# Patient Record
Sex: MALE | Race: BLACK OR AFRICAN AMERICAN | Employment: UNEMPLOYED | URBAN - METROPOLITAN AREA
[De-identification: names, ages, dates, MRNs, and addresses within clinical notes are randomized per-mention and may not be internally consistent; named-entity substitution may affect disease eponyms.]

---

## 2018-10-24 ENCOUNTER — HOSPITAL ENCOUNTER (EMERGENCY)
Age: 23
Discharge: HOME | End: 2018-10-24
Attending: EMERGENCY MEDICINE
Payer: SELF-PAY

## 2018-10-24 VITALS
BODY MASS INDEX: 25.65 KG/M2 | OXYGEN SATURATION: 98 % | HEIGHT: 66 IN | DIASTOLIC BLOOD PRESSURE: 77 MMHG | SYSTOLIC BLOOD PRESSURE: 153 MMHG | WEIGHT: 159.61 LBS | RESPIRATION RATE: 16 BRPM | TEMPERATURE: 99.2 F | HEART RATE: 67 BPM

## 2018-10-24 DIAGNOSIS — K13.0 DRY LIPS: Primary | ICD-10-CM

## 2018-10-24 PROCEDURE — 99282 EMERGENCY DEPT VISIT SF MDM: CPT

## 2018-10-25 NOTE — ED PROVIDER NOTES
24 y.o. male with no significant past medical history presents with complaints of black spot on upper lip x 4 days. The pt denies any pain or tingling at the site. Denies any trouble swallowing. Denies fever, chills, nausea, vomiting. The pt states that he has male sexual partners. He reports negative HIV testing 2 years ago. He explained \"I got tested yesterday and I am waiting on the results. \"  There are no other acute medical complaints at this time. PCP: MD Dianne Burns PA-C Past Medical History:  
Diagnosis Date  Asthma Past Surgical History:  
Procedure Laterality Date  HX TONSIL AND ADENOIDECTOMY History reviewed. No pertinent family history. Social History Socioeconomic History  Marital status: SINGLE Spouse name: Not on file  Number of children: Not on file  Years of education: Not on file  Highest education level: Not on file Social Needs  Financial resource strain: Not on file  Food insecurity - worry: Not on file  Food insecurity - inability: Not on file  Transportation needs - medical: Not on file  Transportation needs - non-medical: Not on file Occupational History  Not on file Tobacco Use  Smoking status: Current Some Day Smoker Substance and Sexual Activity  Alcohol use: Yes  Drug use: No  
 Sexual activity: Yes  
  Partners: Male Birth control/protection: Condom Other Topics Concern  Not on file Social History Narrative  Not on file ALLERGIES: Patient has no known allergies. Review of Systems Constitutional: Negative for activity change, chills, diaphoresis, fever and unexpected weight change. HENT: Negative for congestion, ear pain, nosebleeds, rhinorrhea, sinus pressure, sore throat, trouble swallowing and voice change. Eyes: Negative for pain, discharge and itching.   
Respiratory: Negative for cough, chest tightness, shortness of breath and wheezing. Cardiovascular: Negative for chest pain, palpitations and leg swelling. Gastrointestinal: Negative for abdominal distention, abdominal pain, blood in stool, constipation, diarrhea, nausea and vomiting. Endocrine: Negative for cold intolerance, heat intolerance and polyuria. Genitourinary: Negative for decreased urine volume, discharge, dysuria, flank pain, hematuria and testicular pain. Musculoskeletal: Negative for arthralgias, gait problem, myalgias and neck pain. Skin: Negative for color change and pallor. Neurological: Negative for dizziness, syncope, speech difficulty, weakness, light-headedness, numbness and headaches. Psychiatric/Behavioral: Negative for behavioral problems, confusion, hallucinations and suicidal ideas. Vitals:  
 10/24/18 2114 BP: 153/77 Pulse: 67 Resp: 16 Temp: 99.2 °F (37.3 °C) SpO2: 98% Weight: 72.4 kg (159 lb 9.8 oz) Height: 5' 6\" (1.676 m) Physical Exam  
Constitutional: He is oriented to person, place, and time. He appears well-developed and well-nourished. No distress. HENT:  
Head: Normocephalic and atraumatic. Head is without raccoon's eyes, without Robertson's sign and without laceration. Right Ear: Hearing, tympanic membrane, external ear and ear canal normal. No foreign bodies. Tympanic membrane is not bulging. No hemotympanum. Left Ear: Hearing, tympanic membrane, external ear and ear canal normal. No foreign bodies. Tympanic membrane is not bulging. No hemotympanum. Nose: Nose normal. No mucosal edema or rhinorrhea. Right sinus exhibits no maxillary sinus tenderness and no frontal sinus tenderness. Left sinus exhibits no maxillary sinus tenderness and no frontal sinus tenderness. Mouth/Throat: Uvula is midline, oropharynx is clear and moist and mucous membranes are normal. No tonsillar abscesses. Small dry area on left upper lip. No vesicular lesions present.   Uvula is midline. No trismus or drooling. No pus or signs of infection. No signs of PTA. No cervical adenopathy. No submandibular swelling. Eyes: Conjunctivae and EOM are normal. Pupils are equal, round, and reactive to light. Right eye exhibits no discharge. Left eye exhibits no discharge. Neck: Normal range of motion. Neck supple. Cardiovascular: Normal rate, regular rhythm and normal heart sounds. Exam reveals no gallop and no friction rub. No murmur heard. Regular rate and rhythm. No murmurs, gallops, rubs, or clicks. Pulmonary/Chest: Effort normal and breath sounds normal. No respiratory distress. He has no wheezes. He has no rales. No stridor or wheezes. No accessory muscle usage. No nasal flaring. Breath Sounds equal bilaterally. Musculoskeletal: Normal range of motion. He exhibits no edema, tenderness or deformity. Neurological: He is alert and oriented to person, place, and time. Skin: He is not diaphoretic. Nursing note and vitals reviewed. MDM Number of Diagnoses or Management Options Dry lips:  
Diagnosis management comments: Pt afebrile and nontoxic appearing. No apparent life threatening emergencies. Advised symptomatic treatment of dry lips and close follow up with family doctor. Reviewed treatment plan with attending and they agree. Paulette Mujica PA-C Procedures

## 2018-10-25 NOTE — DISCHARGE INSTRUCTIONS
We hope that we have addressed all of your medical concerns. The examination and treatment you received in the Emergency Department were for an emergent problem and were not intended as complete care. It is important that you follow up with your healthcare provider(s) for ongoing care. If your symptoms worsen or do not improve as expected, and you are unable to reach your usual health care provider(s), you should return to the Emergency Department. Today's healthcare is undergoing tremendous change, and patient satisfaction surveys are one of the many tools to assess the quality of medical care. You may receive a survey from the [facility] regarding your experience in the Emergency Department. I hope that your experience has been completely positive, particularly the medical care that I provided. As such, please participate in the survey; anything less than excellent does not meet my expectations or intentions. [facility] and [address] [place] [address] participate in nationally recognized quality of care measures. If your blood pressure is greater than 120/80, as reported below, we urge that you seek medical care to address the potential of high blood pressure, commonly known as hypertension. Hypertension can be hereditary or can be caused by certain medical conditions, pain, stress, or \"white coat syndrome. \"       Please make an appointment with your health care provider(s) for follow up of your Emergency Department visit. VITALS:   Patient Vitals for the past 8 hrs:   Temp Pulse Resp BP SpO2   10/24/18 2114 99.2 °F (37.3 °C) 67 16 153/77 98 %          Thank you for allowing us to provide you with medical care today. We realize that you have many choices for your emergency care needs. Please choose us in the future for any continued health care needs. Matthew Gould, [address] [place] [address] [address]   Office: [phone]            No results found for this or any previous visit (from the past 24 hour(s)). No results found.

## 2018-10-25 NOTE — ED TRIAGE NOTES
Pt c/o black spot on inside of the upper lip for the past 3-4 days. -Pain just wants to get it checked out.

## 2019-01-29 ENCOUNTER — HOSPITAL ENCOUNTER (EMERGENCY)
Age: 24
Discharge: HOME | End: 2019-01-29
Attending: EMERGENCY MEDICINE
Payer: SELF-PAY

## 2019-01-29 ENCOUNTER — APPOINTMENT (OUTPATIENT)
Dept: CT IMAGING | Age: 24
End: 2019-01-29
Attending: PHYSICIAN ASSISTANT
Payer: SELF-PAY

## 2019-01-29 VITALS
BODY MASS INDEX: 24.62 KG/M2 | RESPIRATION RATE: 14 BRPM | DIASTOLIC BLOOD PRESSURE: 77 MMHG | SYSTOLIC BLOOD PRESSURE: 117 MMHG | TEMPERATURE: 98.8 F | HEIGHT: 66 IN | WEIGHT: 153.22 LBS | OXYGEN SATURATION: 98 % | HEART RATE: 92 BPM

## 2019-01-29 DIAGNOSIS — K62.89 PROCTITIS: Primary | ICD-10-CM

## 2019-01-29 LAB
ALBUMIN SERPL-MCNC: 4 G/DL (ref 3.5–5)
ALBUMIN/GLOB SERPL: 0.9 {RATIO} (ref 1.1–2.2)
ALP SERPL-CCNC: 76 U/L (ref 45–117)
ALT SERPL-CCNC: 14 U/L (ref 12–78)
ANION GAP SERPL CALC-SCNC: 7 MMOL/L (ref 5–15)
APPEARANCE UR: CLEAR
AST SERPL-CCNC: 16 U/L (ref 15–37)
BACTERIA URNS QL MICRO: NEGATIVE /HPF
BASOPHILS # BLD: 0 K/UL (ref 0–0.1)
BASOPHILS NFR BLD: 0 % (ref 0–1)
BILIRUB SERPL-MCNC: 0.3 MG/DL (ref 0.2–1)
BILIRUB UR QL CFM: NEGATIVE
BUN SERPL-MCNC: 14 MG/DL (ref 6–20)
BUN/CREAT SERPL: 12 (ref 12–20)
CALCIUM SERPL-MCNC: 9.1 MG/DL (ref 8.5–10.1)
CHLORIDE SERPL-SCNC: 103 MMOL/L (ref 97–108)
CO2 SERPL-SCNC: 25 MMOL/L (ref 21–32)
COLOR UR: ABNORMAL
CREAT SERPL-MCNC: 1.21 MG/DL (ref 0.7–1.3)
DIFFERENTIAL METHOD BLD: NORMAL
EOSINOPHIL # BLD: 0 K/UL (ref 0–0.4)
EOSINOPHIL NFR BLD: 1 % (ref 0–7)
EPITH CASTS URNS QL MICRO: ABNORMAL /LPF
ERYTHROCYTE [DISTWIDTH] IN BLOOD BY AUTOMATED COUNT: 13.6 % (ref 11.5–14.5)
GLOBULIN SER CALC-MCNC: 4.6 G/DL (ref 2–4)
GLUCOSE SERPL-MCNC: 86 MG/DL (ref 65–100)
GLUCOSE UR STRIP.AUTO-MCNC: NEGATIVE MG/DL
HCT VFR BLD AUTO: 44.8 % (ref 36.6–50.3)
HGB BLD-MCNC: 14.6 G/DL (ref 12.1–17)
HGB UR QL STRIP: NEGATIVE
IMM GRANULOCYTES # BLD AUTO: 0 K/UL (ref 0–0.04)
IMM GRANULOCYTES NFR BLD AUTO: 0 % (ref 0–0.5)
KETONES UR QL STRIP.AUTO: 40 MG/DL
LEUKOCYTE ESTERASE UR QL STRIP.AUTO: NEGATIVE
LYMPHOCYTES # BLD: 1.7 K/UL (ref 0.8–3.5)
LYMPHOCYTES NFR BLD: 26 % (ref 12–49)
MCH RBC QN AUTO: 26.9 PG (ref 26–34)
MCHC RBC AUTO-ENTMCNC: 32.6 G/DL (ref 30–36.5)
MCV RBC AUTO: 82.5 FL (ref 80–99)
MONOCYTES # BLD: 0.8 K/UL (ref 0–1)
MONOCYTES NFR BLD: 12 % (ref 5–13)
MUCOUS THREADS URNS QL MICRO: ABNORMAL /LPF
NEUTS SEG # BLD: 4 K/UL (ref 1.8–8)
NEUTS SEG NFR BLD: 62 % (ref 32–75)
NITRITE UR QL STRIP.AUTO: NEGATIVE
NRBC # BLD: 0 K/UL (ref 0–0.01)
NRBC BLD-RTO: 0 PER 100 WBC
OTHER,OTHU: ABNORMAL
PH UR STRIP: 6 [PH] (ref 5–8)
PLATELET # BLD AUTO: 235 K/UL (ref 150–400)
PMV BLD AUTO: 9.7 FL (ref 8.9–12.9)
POTASSIUM SERPL-SCNC: 3.6 MMOL/L (ref 3.5–5.1)
PROT SERPL-MCNC: 8.6 G/DL (ref 6.4–8.2)
PROT UR STRIP-MCNC: 100 MG/DL
RBC # BLD AUTO: 5.43 M/UL (ref 4.1–5.7)
RBC #/AREA URNS HPF: ABNORMAL /HPF (ref 0–5)
SODIUM SERPL-SCNC: 135 MMOL/L (ref 136–145)
SP GR UR REFRACTOMETRY: <1.03 (ref 1–1.03)
UROBILINOGEN UR QL STRIP.AUTO: 0.2 EU/DL (ref 0.2–1)
WBC # BLD AUTO: 6.5 K/UL (ref 4.1–11.1)
WBC URNS QL MICRO: ABNORMAL /HPF (ref 0–4)

## 2019-01-29 PROCEDURE — 87045 FECES CULTURE AEROBIC BACT: CPT

## 2019-01-29 PROCEDURE — 81001 URINALYSIS AUTO W/SCOPE: CPT

## 2019-01-29 PROCEDURE — 74011636320 HC RX REV CODE- 636/320: Performed by: EMERGENCY MEDICINE

## 2019-01-29 PROCEDURE — 74011250637 HC RX REV CODE- 250/637: Performed by: EMERGENCY MEDICINE

## 2019-01-29 PROCEDURE — 74011250636 HC RX REV CODE- 250/636: Performed by: EMERGENCY MEDICINE

## 2019-01-29 PROCEDURE — 87077 CULTURE AEROBIC IDENTIFY: CPT

## 2019-01-29 PROCEDURE — 99283 EMERGENCY DEPT VISIT LOW MDM: CPT

## 2019-01-29 PROCEDURE — 87147 CULTURE TYPE IMMUNOLOGIC: CPT

## 2019-01-29 PROCEDURE — 80053 COMPREHEN METABOLIC PANEL: CPT

## 2019-01-29 PROCEDURE — 36415 COLL VENOUS BLD VENIPUNCTURE: CPT

## 2019-01-29 PROCEDURE — 74011636320 HC RX REV CODE- 636/320: Performed by: PHYSICIAN ASSISTANT

## 2019-01-29 PROCEDURE — 87186 SC STD MICRODIL/AGAR DIL: CPT

## 2019-01-29 PROCEDURE — 96374 THER/PROPH/DIAG INJ IV PUSH: CPT

## 2019-01-29 PROCEDURE — 85025 COMPLETE CBC W/AUTO DIFF WBC: CPT

## 2019-01-29 PROCEDURE — 96372 THER/PROPH/DIAG INJ SC/IM: CPT

## 2019-01-29 PROCEDURE — 74177 CT ABD & PELVIS W/CONTRAST: CPT

## 2019-01-29 RX ORDER — ONDANSETRON 2 MG/ML
4 INJECTION INTRAMUSCULAR; INTRAVENOUS
Status: COMPLETED | OUTPATIENT
Start: 2019-01-29 | End: 2019-01-29

## 2019-01-29 RX ORDER — DOXYCYCLINE HYCLATE 100 MG
100 TABLET ORAL 2 TIMES DAILY
Qty: 28 TAB | Refills: 0 | Status: SHIPPED | OUTPATIENT
Start: 2019-01-29 | End: 2019-02-12

## 2019-01-29 RX ORDER — LIDOCAINE HYDROCHLORIDE 10 MG/ML
INJECTION, SOLUTION EPIDURAL; INFILTRATION; INTRACAUDAL; PERINEURAL
Status: DISCONTINUED
Start: 2019-01-29 | End: 2019-01-30 | Stop reason: HOSPADM

## 2019-01-29 RX ORDER — SODIUM CHLORIDE 0.9 % (FLUSH) 0.9 %
10 SYRINGE (ML) INJECTION
Status: COMPLETED | OUTPATIENT
Start: 2019-01-29 | End: 2019-01-29

## 2019-01-29 RX ORDER — DOXYCYCLINE HYCLATE 100 MG
100 TABLET ORAL
Status: COMPLETED | OUTPATIENT
Start: 2019-01-29 | End: 2019-01-29

## 2019-01-29 RX ORDER — CEFTRIAXONE 250 MG/8ML
250 INJECTION, POWDER, FOR SOLUTION INTRAMUSCULAR; INTRAVENOUS
Status: DISCONTINUED | OUTPATIENT
Start: 2019-01-29 | End: 2019-01-29

## 2019-01-29 RX ORDER — ONDANSETRON 4 MG/1
4 TABLET, ORALLY DISINTEGRATING ORAL
Qty: 10 TAB | Refills: 0 | Status: SHIPPED | OUTPATIENT
Start: 2019-01-29

## 2019-01-29 RX ADMIN — DIATRIZOATE MEGLUMINE AND DIATRIZOATE SODIUM 30 ML: 660; 100 LIQUID ORAL; RECTAL at 17:20

## 2019-01-29 RX ADMIN — LIDOCAINE HYDROCHLORIDE 250 MG: 10 INJECTION, SOLUTION EPIDURAL; INFILTRATION; INTRACAUDAL; PERINEURAL at 21:31

## 2019-01-29 RX ADMIN — DOXYCYCLINE HYCLATE 100 MG: 100 TABLET, COATED ORAL at 21:30

## 2019-01-29 RX ADMIN — IOPAMIDOL 100 ML: 755 INJECTION, SOLUTION INTRAVENOUS at 19:01

## 2019-01-29 RX ADMIN — ONDANSETRON 4 MG: 2 INJECTION INTRAMUSCULAR; INTRAVENOUS at 18:16

## 2019-01-29 RX ADMIN — Medication 10 ML: at 19:01

## 2019-01-29 NOTE — LETTER
1/31/2019 [phone] [id number] Dear Mr. Mariposa Montiel, You were recently seen in the Emergency Department of [address] [place] [address] and had lab work performed. We would like to discuss these results with you. Please call the Emergency Department at your earliest convenience at [phone] between 10am-8pm to speak with one of our providers. Sincerely, [facility] EMERGENCY DEPT 
[address] [place] [address] 
[phone]

## 2019-01-29 NOTE — ED NOTES
Pt c/o abdominal pain and bright red blood in stool for the past 2-3 days. Endorses mild dizziness and SOB. States seeing blood in stool about every other time he has BM's. Pt is A&O, NAD. Awaiting CT.

## 2019-01-29 NOTE — ED PROVIDER NOTES
EMERGENCY DEPARTMENT HISTORY AND PHYSICAL EXAM 
 
 
Date: 1/29/2019 Patient Name: Corrie Chase History of Presenting Illness Chief Complaint Patient presents with  Abdominal Pain  
  pt reports abdominal pain and bright red blood in stool x2 days  Melena PROVIDER IN TRIAGE NOTE: 
4:58 PM 
Gurinder Meadows PA-C has evaluated the patient as the Provider in Triage (PIT) / provider in [facility] for bloody diarrhea and abdominal pain. The vital signs and the triage nurse assessment have been reviewed. The patient and any available family have been advised that the appropriate studies have been ordered to initiate the work up based on the clinical presentation during the assessment. The pt has been advised that they will be accommodated in Emergency Department as soon as possible. The pt has been requested to contact the triage nurse or PIT/JET immediately if they experiences any changes in their condition during this brief waiting period. History Provided By: Patient HPI: Corrie Chase, 24 y.o. male with PMHx significant for asthma, presents ambulatory to the ED with cc of new onset diarrhea persisting over the last 2-3 days. Pt reports associated sx of subjective fever, chills, nausea, decreased appetite, crampy abd pain, and intermittent lightheadedness as well. He expresses he began with diarrhea 2-3 days ago after any PO intake. Pt discloses no exacerbating or alleviating factors to his sx but noticed streaks of BRB in his diarrhea today leading him to the ED. He denies any h/o similar sx. Pt denies any recent travel or abx use to cause his sx. Pt denies any h/o GI complications or GI surgeries. He denies any chest pain, SOB, abdominal pain, vomiting, or dysuria. There are no other complaints, changes, or physical findings at this time. PCP: Shantal Mendoza MD 
SHx: (+)Tobacco; (+) ETOH; (-) Illicit drug use No current facility-administered medications on file prior to encounter. No current outpatient medications on file prior to encounter. Past History Past Medical History: 
Past Medical History:  
Diagnosis Date  Asthma Past Surgical History: 
Past Surgical History:  
Procedure Laterality Date  HX TONSIL AND ADENOIDECTOMY Family History: No family history on file. Social History: 
Social History Tobacco Use  Smoking status: Current Some Day Smoker Substance Use Topics  Alcohol use: Yes  Drug use: No  
 
 
Allergies: 
No Known Allergies Review of Systems Review of Systems Constitutional: Positive for appetite change, chills and fever (subjective ). HENT: Negative for congestion, rhinorrhea and sore throat. Respiratory: Negative for cough and shortness of breath. Cardiovascular: Negative for chest pain. Gastrointestinal: Positive for diarrhea and nausea. Negative for abdominal pain and vomiting. Genitourinary: Negative for dysuria and urgency. Skin: Negative for rash. Neurological: Positive for light-headedness. Negative for dizziness and headaches. All other systems reviewed and are negative. Physical Exam  
Physical Exam  
Constitutional: He is oriented to person, place, and time. He appears well-developed and well-nourished. No distress. HENT:  
Head: Normocephalic and atraumatic. Eyes: Conjunctivae and EOM are normal. Pupils are equal, round, and reactive to light. Neck: Normal range of motion. Cardiovascular: Normal rate, regular rhythm and intact distal pulses. Pulmonary/Chest: Effort normal and breath sounds normal. No stridor. No respiratory distress. Abdominal: Soft. He exhibits no distension. There is no tenderness. Musculoskeletal: Normal range of motion. Neurological: He is alert and oriented to person, place, and time. Skin: Skin is warm and dry. Psychiatric: He has a normal mood and affect. Nursing note and vitals reviewed. Diagnostic Study Results Labs - Recent Results (from the past 12 hour(s)) URINALYSIS W/ RFLX MICROSCOPIC Collection Time: 01/29/19  4:00 PM  
Result Value Ref Range Color DARK YELLOW Appearance CLEAR CLEAR Specific gravity <1.030 1.003 - 1.030  
 pH (UA) 6.0 5.0 - 8.0 Protein 100 (A) NEG mg/dL Glucose NEGATIVE  NEG mg/dL Ketone 40 (A) NEG mg/dL Blood NEGATIVE  NEG Urobilinogen 0.2 0.2 - 1.0 EU/dL Nitrites NEGATIVE  NEG Leukocyte Esterase NEGATIVE  NEG    
 WBC 0-4 0 - 4 /hpf  
 RBC 0-5 0 - 5 /hpf Epithelial cells FEW FEW /lpf Bacteria NEGATIVE  NEG /hpf Mucus 4+ (A) NEG /lpf Other: Renal Epithelial cells Present BILIRUBIN, CONFIRM Collection Time: 01/29/19  4:00 PM  
Result Value Ref Range Bilirubin UA, confirm NEGATIVE  NEG    
CBC WITH AUTOMATED DIFF Collection Time: 01/29/19  4:10 PM  
Result Value Ref Range WBC 6.5 4.1 - 11.1 K/uL  
 RBC 5.43 4.10 - 5.70 M/uL  
 HGB 14.6 12.1 - 17.0 g/dL HCT 44.8 36.6 - 50.3 % MCV 82.5 80.0 - 99.0 FL  
 MCH 26.9 26.0 - 34.0 PG  
 MCHC 32.6 30.0 - 36.5 g/dL  
 RDW 13.6 11.5 - 14.5 % PLATELET 939 182 - 169 K/uL MPV 9.7 8.9 - 12.9 FL  
 NRBC 0.0 0  WBC ABSOLUTE NRBC 0.00 0.00 - 0.01 K/uL NEUTROPHILS 62 32 - 75 % LYMPHOCYTES 26 12 - 49 % MONOCYTES 12 5 - 13 % EOSINOPHILS 1 0 - 7 % BASOPHILS 0 0 - 1 % IMMATURE GRANULOCYTES 0 0.0 - 0.5 % ABS. NEUTROPHILS 4.0 1.8 - 8.0 K/UL  
 ABS. LYMPHOCYTES 1.7 0.8 - 3.5 K/UL  
 ABS. MONOCYTES 0.8 0.0 - 1.0 K/UL  
 ABS. EOSINOPHILS 0.0 0.0 - 0.4 K/UL  
 ABS. BASOPHILS 0.0 0.0 - 0.1 K/UL  
 ABS. IMM. GRANS. 0.0 0.00 - 0.04 K/UL  
 DF AUTOMATED METABOLIC PANEL, COMPREHENSIVE Collection Time: 01/29/19  4:10 PM  
Result Value Ref Range Sodium 135 (L) 136 - 145 mmol/L Potassium 3.6 3.5 - 5.1 mmol/L  Chloride 103 97 - 108 mmol/L  
 CO2 25 21 - 32 mmol/L  
 Anion gap 7 5 - 15 mmol/L Glucose 86 65 - 100 mg/dL BUN 14 6 - 20 MG/DL Creatinine 1.21 0.70 - 1.30 MG/DL  
 BUN/Creatinine ratio 12 12 - 20 GFR est AA >60 >60 ml/min/1.73m2 GFR est non-AA >60 >60 ml/min/1.73m2 Calcium 9.1 8.5 - 10.1 MG/DL Bilirubin, total 0.3 0.2 - 1.0 MG/DL  
 ALT (SGPT) 14 12 - 78 U/L  
 AST (SGOT) 16 15 - 37 U/L Alk. phosphatase 76 45 - 117 U/L Protein, total 8.6 (H) 6.4 - 8.2 g/dL Albumin 4.0 3.5 - 5.0 g/dL Globulin 4.6 (H) 2.0 - 4.0 g/dL A-G Ratio 0.9 (L) 1.1 - 2.2 Radiologic Studies -  
CT ABD PELV W CONT Final Result IMPRESSION: Differential thickening of the rectum and mild stranding of the  
adjacent perirectal fat. Findings are consistent with proctitis. Medical Decision Making I am the first provider for this patient. I reviewed the vital signs, available nursing notes, past medical history, past surgical history, family history and social history. Vital Signs-Reviewed the patient's vital signs. Patient Vitals for the past 12 hrs: 
 Temp Pulse Resp BP SpO2  
01/29/19 1930 98.8 °F (37.1 °C) 92 14 117/77 98 % 01/29/19 1551 99.1 °F (37.3 °C) 77 16 (!) 162/97 99 % Pulse Oximetry Analysis - 99% on room air Cardiac Monitor:  
Rate: 77 bpm 
Rhythm: Normal Sinus Rhythm Records Reviewed: Nursing Notes, Old Medical Records, Previous Radiology Studies and Previous Laboratory Studies Provider Notes (Medical Decision Making): DDx: Infectious diarrhea, Colitis, Diverticulitis, appendicitis On exam, patient is well appearing with stable vital signs. Given benign abdominal exam, doubt acute surgical pathology. Will check CT abdomen given reports of blood in stool, will check basic labs, given nausea medication prn. Will also send stool culture if pt has additional BM in the ED. ED Course:  
Initial assessment performed.  The patients presenting problems have been discussed, and they are in agreement with the care plan formulated and outlined with them. I have encouraged them to ask questions as they arise throughout their visit. Progress Notes: 
 
8:56 PM  
The pt has been re-evaluated. He was updated on reassuring labs and CT findings during ED stay. Of note, patient is sexually active with men only. Will tx proctitis with rocephin and doxy. Recommended PCP f/u and gave strict return precautions. Critical Care Time: 0 minutes Disposition: 
Discharge Note: 
8:57 PM 
The patient is ready for discharge. The patient's signs, symptoms, diagnosis, and discharge instruction have been discussed and the patient has conveyed their understanding. The patient is to follow up as recommended or return to the ER should their symptoms worsen. Plan has been discussed and the patient is in agreement. Written by Bill Sexton ED Scribe, as dictated by YSABEL Guerrero MD 
 
PLAN: 
1. Discharge Medication List as of 1/29/2019  9:00 PM  
  
START taking these medications Details  
doxycycline (VIBRA-TABS) 100 mg tablet Take 1 Tab by mouth two (2) times a day for 14 days. , Normal, Disp-28 Tab, R-0  
  
  
 
2. Follow-up Information Follow up With Specialties Details Why Contact Info Derenda Klinefelter, MD Pediatrics Schedule an appointment as soon as possible for a visit  [address] 
[phone] [facility] EMERGENCY DEPT Emergency Medicine  As needed, If symptoms worsen [address] [facility] 
[phone] Return to ED if worse Diagnosis Clinical Impression: 1. Proctitis Attestations: 
 
Attestation: This note is prepared by Janay Sexton, acting as Scribe for YSABEL Guerrero MD. YSABEL Guerrero MD: The scribe's documentation has been prepared under my direction and personally reviewed by me in its entirety.  I confirm that the note above accurately reflects all work, treatment, procedures, and medical decision making performed by me.

## 2019-01-30 NOTE — DISCHARGE INSTRUCTIONS
Patient Education      Local Primary Care Physicians     [facility] [phone]   Toy Ortiz, MD Sonia Guerrero, MD Severa Finders, MD [facility] [phone]   Sarah Prescott, [facility]   Bernardibandar Humphries, MD Renetta Hale MD [address] [place] [place] [address] [phone]   [place]ckenna [place]illo [place] [address] [facility] [phone]   [facility], MD Soraya Singh, MD Lourdes Harding, MD Gretchen Acuna MD     [facility] [phone]   [facility] , MD Debra Palomares, MD [facility], NP [address] [phone]   MD Maria Esther Stewart, MD Petros Ennis, MD Kathleen Mendoza, MD Bard Valera, MD Emily Vera, MD Eber Gray MD     [address] [facility], MD    [facility] [phone]   MD Margaux Moore, NP   Jill Lima, MD Marlys Robison, MD Jameson Saldana, MD Richy Lugo MD   [address] [facility] [phone]   Frances Pandya, MD Jessica Lin, [facility]   Herbert Mckoy, NP   Ryann Patterson, MD Kun Wang, MD Edwar Sargent MD   [facility] [phone]   Caryn Felty, MD Rosemarie Paterson, MD Maralyn Kale, MD Belva Moats, MD Bassam Wright MD     [address] [phone]   Carles Siemens, MD Sesar Rendon [phone]   MD Christian Salguero MD Larene Edman, MD     [facility] [phone]   Valente Clark, MD Jordan Kim, MD Bev Bowman, MD Som Roper, MD Carleton Goltz, GENEVA Chua MD     [address]   [phone]   MD Lupe Vasquez MD     [address] [facility] [phone]     [facility], MD Bertell Lefort, ISI Schmitt, ISI Pena MD Stoney Pacer, NP   Joseph Fuller, DO Miscellaneous:     [facility]    For adult and child immunizations, family planning, TB screening, STD testing and women's health services. [facility]: [place] [phone]   [address] [place] [address]   [address] [place] [address]: [facility] 2700 [facility] [phone] 2400 [facility]        [address] [address]    For primary care services, woman and child wellness, and some clinics providing specialty care. [facility] -- [facility] [facility]. [facility] [phone]  [address] [place] [address] 200 [facility] 3614 [facility] [phone]  [address] [address] [place] [address] [phone] [address] 1604 [facility] 5850  Community  [phone]  7700 [facility] [address] [address] [place] [phone]  [facility] 81 [facility] [phone]  [facility] 1051 [facility] [phone]  [facility]: [facility] 700 [facility] [phone]    [address] [phone]  [facility] [address]  [phone]  [employer]  [address], [address] ([email or web address]) [phone]       Sexual Health/Woman Wellness Clinics   For STD/HIV testing and treatment, pregnancy testing and services, men's health, birth control services, LGBT services, and hepatitis/HPV vaccine services. [employer] for [place] [employer] [address]. [facility] 1900 [facility] 300 [facility] 600 [address] [place] [phone]  [address] [place] [address] Rd, 5th floor [phone]  Pregnancy [facility] of [address] 3550 [employer] [facility] [address] [phone]       8260 [facility] 401 [facility],[address]   [phone]  [place]   [phone]    Women, Infant and Children's Services:   [address] [phone]  [address] [phone]  [facility]   [phone]  [address] [place] [address]   [phone]  [facility]     [phone]  [address] Crisis   [address] [facility] [address] [phone]          Thank you for allowing us to provide you with excellent care today. We hope we addressed all of your concerns and needs. We strive to provide excellent quality care in the Emergency Department. Please rate us as excellent, as anything less than excellent does not meet our expectations. If you feel that you have not received excellent quality care or timely care, please ask to speak to the nurse manager. Please choose us in the future for your continued health care needs. The exam and treatment you received in the Emergency Department were for an urgent problem and are not intended as complete care. It is important that you follow up with a doctor, nurse practitioner, or physician assistant for ongoing care. If your symptoms become worse or you do not improve as expected and you are unable to reach your usual health care provider, you should return to the Emergency Department. We are available 24 hours a day. Take this sheet with you when you go to your follow-up visit. If you have any problem arranging the follow-up visit, contact the Emergency Department immediately. If a prescription has been provided, please have it filled as soon as possible to avoid a delay in treatment. Read the entire medication instruction sheet provided to you by the pharmacy.  If you have any questions or reservations about taking the medication due to side effects or interactions with other medications please call the ER or your primary care physician. Take this sheet with you when you go to your follow-up visit. Make an appointment with your family doctor or the physician you were referred to for follow-up of this visit, as this is mandatory follow-up. Return to the ER if you are unable to be seen or if you are unable to be seen in a timely manner. If you have any problem arranging the follow-up visit, contact the Emergency Department immediately. Proctitis: Care Instructions  Your Care Instructions  Proctitis is inflammation of the lining of the rectum. It can be a short-term or long-term problem. Many things can cause proctitis. It may be a side effect of medical treatments, such as radiation therapy or antibiotics. Some sexually transmitted diseases may also cause proctitis. It may be related to ulcerative colitis or to Crohn's disease. Other causes include bacterial infection, allergies, or injury or nerve problems in the rectum. Common symptoms include pain or itching in the rectum and a constant or frequent strong need to have a bowel movement. You may have a change in bowel habits; a fever; and mucus, blood, or pus in your stools. Follow-up care is a key part of your treatment and safety. Be sure to make and go to all appointments, and call your doctor if you are having problems. It's also a good idea to know your test results and keep a list of the medicines you take. How can you care for yourself at home? · Take your medicines exactly as prescribed. Call your doctor if you think you are having a problem with your medicine. You will get more details on the specific medicines your doctor prescribes. · If your doctor prescribed antibiotics, take them as directed. Do not stop taking them just because you feel better. You need to take the full course of antibiotics.   · Some complementary treatments may help. These include acupuncture, herbal remedies, and diet supplements. Be sure to talk to your doctor before you use any complementary treatment. · Avoid anal intercourse. This will prevent further damage to the anal canal and give it time to heal.  · Avoid foods that seem to make your symptoms worse. Common problem foods include dairy products, foods and drinks that contain caffeine, and high-fat foods. These foods can irritate the digestive tract and make conditions like ulcerative colitis worse. · Take steps to reduce stress. Exercises such as yoga or nadja chi can help you deal with stress. Talk to your doctor about these and other methods of reducing stress. When should you call for help? Call your doctor now or seek immediate medical care if:    · You have new or worse pain.     · You have new or worse bleeding from the rectum.    Watch closely for changes in your health, and be sure to contact your doctor if:    · You cannot pass stools or gas.     · You do not get better as expected. Where can you learn more? Go to http://katherine-kan.info/. Enter T253 in the search box to learn more about \"Proctitis: Care Instructions. \"  Current as of: March 27, 2018  Content Version: 11.9  © 9727-7054 "[employer]. Care instructions adapted under license by [employer] (which disclaims liability or warranty for this information). If you have questions about a medical condition or this instruction, always ask your healthcare professional. [address] any warranty or liability for your use of this information.

## 2019-01-31 RX ORDER — CIPROFLOXACIN 500 MG/1
500 TABLET ORAL 2 TIMES DAILY
Qty: 20 TAB | Refills: 0 | Status: SHIPPED | OUTPATIENT
Start: 2019-01-31 | End: 2019-02-10

## 2019-02-01 LAB
BACTERIA SPEC CULT: ABNORMAL
C JEJUNI+C COLI AG STL QL: NEGATIVE
E COLI SXT1+2 STL IA: NEGATIVE
SERVICE CMNT-IMP: ABNORMAL

## 2019-03-10 LAB
Lab: NORMAL
REFERENCE LAB,REFLB: NORMAL
TEST DESCRIPTION:,ATST: NORMAL